# Patient Record
Sex: FEMALE | Race: WHITE | Employment: PART TIME | URBAN - METROPOLITAN AREA
[De-identification: names, ages, dates, MRNs, and addresses within clinical notes are randomized per-mention and may not be internally consistent; named-entity substitution may affect disease eponyms.]

---

## 2017-01-05 ENCOUNTER — TELEPHONE (OUTPATIENT)
Dept: INTERNAL MEDICINE CLINIC | Age: 52
End: 2017-01-05

## 2017-01-05 NOTE — TELEPHONE ENCOUNTER
Patient is requesting lab orders to be mailed to her house ahead of her appointment in February so she can get the labs done prior. Call her if you have any questions.

## 2017-01-18 ENCOUNTER — TELEPHONE (OUTPATIENT)
Dept: INTERNAL MEDICINE CLINIC | Age: 52
End: 2017-01-18

## 2017-01-18 NOTE — TELEPHONE ENCOUNTER
----- Message from Litzy Silva sent at 1/18/2017 11:56 AM EST -----  Regarding: Amy Morrow / Telephone  The patient is requesting a call back from the nurse to discuss lab work.  ([phone]

## 2017-01-18 NOTE — TELEPHONE ENCOUNTER
Returned patient's call. Patient stated when she initially made her physical appt back in December she was told by the  labs would be ordered prior to her appt & would be placed in the mail to complete. After reviewing her chart I noticed she called in early January requesting for labs prior to appt, and she notes then she was told by the  again she could have labs ordered prior to her visit. I discussed reasoning for not ordering labs prior to visits due to insurance issues with passing diagnosis codes. I also advised PCP does not prefer to order labs prior to visits. Patient states she pays out of pocket for her coverage and does not have the time or money to keep making follow up visits for abnormal labs. She notes a history of elevated liver enzymes & thinks its beneficial to get her labs done prior to discuss in detail at the visit. Patient stated she is going to send a Jigsaw Meetingt  Message to Cherelle Alonso to see if she will order labs prior.

## 2017-01-24 DIAGNOSIS — Z13.9 SCREENING: Primary | ICD-10-CM

## 2017-01-31 LAB
ALBUMIN SERPL-MCNC: 4.1 G/DL (ref 3.5–5.5)
ALBUMIN/GLOB SERPL: 1.5 {RATIO} (ref 1.1–2.5)
ALP SERPL-CCNC: 100 IU/L (ref 39–117)
ALT SERPL-CCNC: 54 IU/L (ref 0–32)
APPEARANCE UR: CLEAR
AST SERPL-CCNC: 39 IU/L (ref 0–40)
BASOPHILS # BLD AUTO: 0 X10E3/UL (ref 0–0.2)
BASOPHILS NFR BLD AUTO: 0 %
BILIRUB SERPL-MCNC: 0.4 MG/DL (ref 0–1.2)
BILIRUB UR QL STRIP: NEGATIVE
BUN SERPL-MCNC: 16 MG/DL (ref 6–24)
BUN/CREAT SERPL: 22 (ref 9–23)
CALCIUM SERPL-MCNC: 9.2 MG/DL (ref 8.7–10.2)
CHLORIDE SERPL-SCNC: 101 MMOL/L (ref 96–106)
CHOLEST SERPL-MCNC: 209 MG/DL (ref 100–199)
CO2 SERPL-SCNC: 26 MMOL/L (ref 18–29)
COLOR UR: YELLOW
CREAT SERPL-MCNC: 0.72 MG/DL (ref 0.57–1)
EOSINOPHIL # BLD AUTO: 0.1 X10E3/UL (ref 0–0.4)
EOSINOPHIL NFR BLD AUTO: 3 %
ERYTHROCYTE [DISTWIDTH] IN BLOOD BY AUTOMATED COUNT: 12.8 % (ref 12.3–15.4)
GLOBULIN SER CALC-MCNC: 2.7 G/DL (ref 1.5–4.5)
GLUCOSE SERPL-MCNC: 90 MG/DL (ref 65–99)
GLUCOSE UR QL: NEGATIVE
HCT VFR BLD AUTO: 40.7 % (ref 34–46.6)
HDLC SERPL-MCNC: 79 MG/DL
HGB BLD-MCNC: 13.5 G/DL (ref 11.1–15.9)
HGB UR QL STRIP: NEGATIVE
IMM GRANULOCYTES # BLD: 0 X10E3/UL (ref 0–0.1)
IMM GRANULOCYTES NFR BLD: 0 %
INTERPRETATION, 910389: NORMAL
KETONES UR QL STRIP: NEGATIVE
LDLC SERPL CALC-MCNC: 113 MG/DL (ref 0–99)
LEUKOCYTE ESTERASE UR QL STRIP: NEGATIVE
LYMPHOCYTES # BLD AUTO: 2.4 X10E3/UL (ref 0.7–3.1)
LYMPHOCYTES NFR BLD AUTO: 49 %
MCH RBC QN AUTO: 31.1 PG (ref 26.6–33)
MCHC RBC AUTO-ENTMCNC: 33.2 G/DL (ref 31.5–35.7)
MCV RBC AUTO: 94 FL (ref 79–97)
MICRO URNS: NORMAL
MONOCYTES # BLD AUTO: 0.3 X10E3/UL (ref 0.1–0.9)
MONOCYTES NFR BLD AUTO: 6 %
NEUTROPHILS # BLD AUTO: 2.1 X10E3/UL (ref 1.4–7)
NEUTROPHILS NFR BLD AUTO: 42 %
NITRITE UR QL STRIP: NEGATIVE
PH UR STRIP: 6.5 [PH] (ref 5–7.5)
PLATELET # BLD AUTO: 229 X10E3/UL (ref 150–379)
POTASSIUM SERPL-SCNC: 3.7 MMOL/L (ref 3.5–5.2)
PROT SERPL-MCNC: 6.8 G/DL (ref 6–8.5)
PROT UR QL STRIP: NEGATIVE
RBC # BLD AUTO: 4.34 X10E6/UL (ref 3.77–5.28)
SODIUM SERPL-SCNC: 142 MMOL/L (ref 134–144)
SP GR UR: 1.02 (ref 1–1.03)
T4 FREE SERPL-MCNC: 1.18 NG/DL (ref 0.82–1.77)
TRIGL SERPL-MCNC: 86 MG/DL (ref 0–149)
TSH SERPL DL<=0.005 MIU/L-ACNC: 1.92 UIU/ML (ref 0.45–4.5)
UROBILINOGEN UR STRIP-MCNC: 0.2 MG/DL (ref 0.2–1)
VLDLC SERPL CALC-MCNC: 17 MG/DL (ref 5–40)
WBC # BLD AUTO: 5 X10E3/UL (ref 3.4–10.8)

## 2017-02-13 ENCOUNTER — OFFICE VISIT (OUTPATIENT)
Dept: INTERNAL MEDICINE CLINIC | Age: 52
End: 2017-02-13

## 2017-02-13 VITALS
HEART RATE: 77 BPM | WEIGHT: 130.2 LBS | DIASTOLIC BLOOD PRESSURE: 83 MMHG | SYSTOLIC BLOOD PRESSURE: 131 MMHG | OXYGEN SATURATION: 99 % | HEIGHT: 63 IN | TEMPERATURE: 98.7 F | BODY MASS INDEX: 23.07 KG/M2 | RESPIRATION RATE: 16 BRPM

## 2017-02-13 DIAGNOSIS — R79.89 ABNORMAL LFTS: ICD-10-CM

## 2017-02-13 DIAGNOSIS — Z90.710 H/O: HYSTERECTOMY: ICD-10-CM

## 2017-02-13 DIAGNOSIS — Z00.00 WELL WOMAN EXAM (NO GYNECOLOGICAL EXAM): Primary | ICD-10-CM

## 2017-02-13 NOTE — MR AVS SNAPSHOT
Visit Information Date & Time Provider Department Dept. Phone Encounter #  
 2/13/2017  1:30 PM Yousuf Mcwilliams MD [facility] [address] [id number] Upcoming Health Maintenance Date Due INFLUENZA AGE 9 TO ADULT 8/1/2016 BREAST CANCER SCRN MAMMOGRAM 6/29/2018 PAP AKA CERVICAL CYTOLOGY 7/1/2019 DTaP/Tdap/Td series (2 - Td) 12/23/2023 COLONOSCOPY 2/21/2024 Allergies as of 2/13/2017  Review Complete On: 2/13/2017 By: Yousuf Mcwilliams MD  
 No Known Allergies Current Immunizations  Reviewed on 2/13/2017 Name Date Influenza Vaccine 10/15/2016 Tdap 12/23/2013 Reviewed by Yousuf Mcwilliams MD on 2/13/2017 at  1:53 PM  
You Were Diagnosed With   
  
 Codes Comments Well woman exam (no gynecological exam)    -  Primary ICD-10-CM: Z00.00 ICD-9-CM: V70.0 Abnormal LFTs     ICD-10-CM: R79.89 ICD-9-CM: 790.6 H/O: hysterectomy     ICD-10-CM: Z90.710 ICD-9-CM: V88.01 Vitals BP Pulse Temp Resp Height(growth percentile) Weight(growth percentile) 131/83 (BP 1 Location: Left arm, BP Patient Position: Sitting) 77 98.7 °F (37.1 °C) (Oral) 16 5' 2.5\" (1.588 m) 130 lb 3.2 oz (59.1 kg) LMP SpO2 BMI OB Status Smoking Status (Exact Date) 99% 23.43 kg/m2 Hysterectomy Never Smoker Vitals History BMI and BSA Data Body Mass Index Body Surface Area  
 23.43 kg/m 2 1.61 m 2 Preferred Pharmacy Pharmacy Name Phone [facility] [phone] Your Updated Medication List  
  
   
This list is accurate as of: 2/13/17  2:02 PM.  Always use your most recent med list.  
  
  
  
  
 Biotin 5 mg Tab Take  by mouth.  
  
 clobetasol 0.05 % topical cream  
Commonly known as:  Luetta Slimmer Apply  to affected area two (2) times a day. ESTRACE 0.5 mg tablet Generic drug:  estradiol Take  by mouth daily. STOOL SOFTENER PO Take  by mouth. Introducing [facility] & HEALTH SERVICES! Dear Noel Isaac: Thank you for requesting a [employer] account. Our records indicate that you already have an active [employer] account. You can access your account anytime at [email or web address]. DrDoctor/[employer] Did you know that you can access your hospital and ER discharge instructions at any time in Br[employer]? You can also review all of your test results from your hospital stay or ER visit. Additional Information If you have questions, please visit the Frequently Asked Questions section of the [employer] website at [email or web address] [email or web address]/. Remember, Br[employer] is NOT to be used for urgent needs. For medical emergencies, dial 911. Now available from your iPhone and Android! Please provide this summary of care documentation to your next provider. Your primary care clinician is listed as Shanna 68. If you have any questions after today's visit, please call [phone].

## 2017-02-13 NOTE — PROGRESS NOTES
Subjective:   50 y.o. female for Well Woman Check. Her gyne and breast care is done elsewhere by her Ob-Gyne physician. 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  clobetasol (TEMOVATE) 0.05 % topical cream Apply  to affected area two (2) times a day. 30 g 0    DOCUSATE CALCIUM (STOOL SOFTENER PO) Take  by mouth.  Biotin 5 mg tab Take  by mouth. No Known Allergies  History reviewed. No pertinent past medical history. Past Surgical History   Procedure Laterality Date    Hx gyn       hysterectomy for fibroids     Family History   Problem Relation Age of Onset    Hypertension Mother    [facility] Elevated Lipids Mother     Cancer Father      prostate ca     Social History   Substance Use Topics    Smoking status: Never Smoker    Smokeless tobacco: Not on file    Alcohol use 1.5 oz/week     3 Cans of beer per week      Comment: social             ROS: Feeling generally well. No TIA's or unusual headaches, no dysphagia. No prolonged cough. No dyspnea or chest pain on exertion. No abdominal pain, change in bowel habits, black or bloody stools. No urinary tract symptoms. No new or unusual musculoskeletal symptoms. Specific concerns today: colonoscopy normal in 2015  Pap in 3/13  mammo in 6/16  Had alt of 56 and notes occas ruq pain with sxs of indigestion -infreq episodes. Objective: The patient appears well, alert, oriented x 3, in no distress.   Visit Vitals    /83 (BP 1 Location: Left arm, BP Patient Position: Sitting)    Pulse 77    Temp 98.7 °F (37.1 °C) (Oral)    Resp 16    Ht 5' 2.5\" (1.588 m)    Wt 130 lb 3.2 oz (59.1 kg)    LMP  (Exact Date)    SpO2 99%    BMI 23.43 kg/m2     ENT normal.  Neck supple. No adenopathy or thyromegaly. SARAH. Lungs are clear, good air entry, no wheezes, rhonchi or rales. S1 and S2 normal, no murmurs, regular rate and rhythm. Abdomen soft with mild  Tenderness ruq guarding, mass or organomegaly. Extremities show no edema, normal peripheral pulses. Neurological is normal, no focal findings.   Breast and Pelvic exams deferred    Assessment/Plan:   Well Woman  increase physical activity, follow low fat diet  Lilia was seen today for complete physical.    Diagnoses and all orders for this visit:    Well woman exam (no gynecological exam)  Recent labs normal except for the alt mildly inc and this is chronic  Abnormal LFTs  -     US ABD COMP; Future    H/O: hysterectomy  cotn gyn care

## 2017-03-10 ENCOUNTER — HOSPITAL ENCOUNTER (OUTPATIENT)
Dept: ULTRASOUND IMAGING | Age: 52
Discharge: HOME | End: 2017-03-10
Attending: INTERNAL MEDICINE
Payer: COMMERCIAL

## 2017-03-10 DIAGNOSIS — R79.89 ABNORMAL LFTS: ICD-10-CM

## 2017-03-10 PROCEDURE — 76700 US EXAM ABDOM COMPLETE: CPT

## 2017-03-10 NOTE — LETTER
4/17/2017 2:09 PM 
 
Ms. Park [address] [address] Dear Carlni Smith: 
 
Please find your most recent results below. Resulted Orders US ABD COMP Narrative EXAM:  US ABD COMP INDICATION: Other specified abnormal findings of blood chemistry, abnormal liver 
function tests. COMPARISON: None. TECHNIQUE:  
Real-time sonography of the abdomen was performed with multiple static images of 
the liver, gallbladder, pancreas, spleen, kidneys and retroperitoneum obtained. FINDINGS: 
LIVER:  
The liver is normal in echotexture with no mass or other focal abnormality. LIVER VASCULATURE: The portal vein flow is towards the liver. GALLBLADDER: 
The gallbladder is normal and no stones are identified. There is no wall 
thickening or fluid around the gallbladder. COMMON BILE DUCT: 
There is no biliary duct dilatation and the common duct measures 5 mm in 
diameter. PANCREAS: 
The visualized pancreas is normal. 
 
SPLEEN: 
The spleen is normal in echotexture and size and measures 8 cm in length. RIGHT KIDNEY: 
The right kidney demonstrates normal echogenicity with no mass, stone or 
hydronephrosis. The right kidney measures 8.9 cm in length. LEFT KIDNEY: 
The left kidney demonstrates normal echogenicity with no mass, stone or 
hydronephrosis. The left kidney measures 8.7 cm in length. RETROPERITONEUM: 
The aorta tapers normally. The IVC is normal. 
No retroperitoneal mass is identified. Impression IMPRESSION: Normal abdominal ultrasound examination. RECOMMENDATIONS: 
None. Keep up the good work! Please call me if you have any questions: [phone] Sincerely, [facility] US 2

## 2017-03-13 ENCOUNTER — TELEPHONE (OUTPATIENT)
Dept: INTERNAL MEDICINE CLINIC | Age: 52
End: 2017-03-13

## 2017-03-13 NOTE — TELEPHONE ENCOUNTER
----- Message from Radhika Lin LPN sent at 5/80/8264 10:52 AM EDT -----      ----- Message -----     From: Marta Orozco MD     Sent: 3/11/2017   7:36 PM       To:  Radhika Lin LPN    Notify of normal abd us

## 2018-01-23 ENCOUNTER — OFFICE VISIT (OUTPATIENT)
Dept: INTERNAL MEDICINE CLINIC | Age: 53
End: 2018-01-23

## 2018-01-23 ENCOUNTER — TELEPHONE (OUTPATIENT)
Dept: INTERNAL MEDICINE CLINIC | Age: 53
End: 2018-01-23

## 2018-01-23 VITALS
TEMPERATURE: 98.5 F | OXYGEN SATURATION: 98 % | HEIGHT: 63 IN | HEART RATE: 87 BPM | RESPIRATION RATE: 16 BRPM | DIASTOLIC BLOOD PRESSURE: 79 MMHG | BODY MASS INDEX: 24.27 KG/M2 | SYSTOLIC BLOOD PRESSURE: 116 MMHG | WEIGHT: 137 LBS

## 2018-01-23 DIAGNOSIS — Z80.0 FH: COLON CANCER IN RELATIVE DIAGNOSED AT >50 YEARS OLD: ICD-10-CM

## 2018-01-23 DIAGNOSIS — M79.641 PAIN OF RIGHT HAND: Primary | ICD-10-CM

## 2018-01-23 NOTE — MR AVS SNAPSHOT
[address] 
[phone] Patient: Alanna Lau MRN: [id number] :1965 Visit Information Date & Time Provider Department Dept. Phone Encounter #  
 2018  8:15 AM Eladia Benedict MD [facility] [address] [id number] Upcoming Health Maintenance Date Due  
 BREAST CANCER SCRN MAMMOGRAM 2018 PAP AKA CERVICAL CYTOLOGY 2019 DTaP/Tdap/Td series (2 - Td) 2023 COLONOSCOPY 2024 Allergies as of 2018  Review Complete On: 2017 By: Eladia Benedict MD  
 No Known Allergies Current Immunizations  Reviewed on 2018 Name Date Hep B Vaccine 2017 Influenza Vaccine 10/15/2017, 10/15/2016 Tdap 2013 Reviewed by Eladia Benedict MD on 2018 at  8:09 AM  
 Reviewed by Eladia Benedict MD on 2018 at  8:18 AM  
You Were Diagnosed With   
  
 Codes Comments Pain of right hand    -  Primary ICD-10-CM: M79.641 ICD-9-CM: 729.5 FH: colon cancer in relative diagnosed at >47 years old     ICD-10-CM: Z80.0 ICD-9-CM: V16.0 Vitals BP Pulse Temp Resp Height(growth percentile) Weight(growth percentile) 116/79 (BP 1 Location: Left arm, BP Patient Position: Sitting) 87 98.5 °F (36.9 °C) (Oral) 16 5' 2.5\" (1.588 m) 137 lb (62.1 kg) SpO2 BMI OB Status Smoking Status 98% 24.66 kg/m2 Hysterectomy Never Smoker Vitals History BMI and BSA Data Body Mass Index Body Surface Area  
 24.66 kg/m 2 1.65 m 2 Preferred Pharmacy Pharmacy Name Phone [address] [facility], [address] [facility] [address] Your Updated Medication List  
  
   
This list is accurate as of: 18  8:19 AM.  Always use your most recent med list.  
  
  
  
  
 Biotin 5 mg tablet Commonly known as:  VITAMIN B7 Take  by mouth. ESTRACE 0.5 mg tablet Generic drug:  estradiol Take  by mouth daily. STOOL SOFTENER PO Take  by mouth. We Performed the Following REFERRAL TO ORTHOPEDICS [MDC817 Custom] Referral Information Referral ID Referred By Referred To  
  
 [id number] [facility] ELIZABETH VERA [facility], Lucy Balbuena MD   
   [address] [facility] Phone: [phone] Fax: [phone] Visits Status Start Date End Date 1 New Request 1/23/18 1/23/19 If your referral has a status of pending review or denied, additional information will be sent to support the outcome of this decision. Introducing [facility] & HEALTH SERVICES! Dear Nikolai: Thank you for requesting a [employer] account. Our records indicate that you already have an active [employer] account. You can access your account anytime at [email or web address] Seen on TV. RegaloCard/As Seen on TV Did you know that you can access your hospital and ER discharge instructions at any time in [employer]? You can also review all of your test results from your hospital stay or ER visit. Additional Information If you have questions, please visit the Frequently Asked Questions section of the [employer] website at [email or web address] [email or web address] on [email or web address]/As Seen on TV/. Remember, [employer] is NOT to be used for urgent needs. For medical emergencies, dial 911. Now available from your iPhone and Android! Please provide this summary of care documentation to your next provider. Your primary care clinician is listed as Shanna Aguilar. If you have any questions after today's visit, please call [phone].

## 2018-01-23 NOTE — TELEPHONE ENCOUNTER
Referral obtained and faxed to Dr Joanna Miranda office at [phone].  AUth # [id number]  00 visits  1/23/18-7/23/18

## 2018-01-23 NOTE — PROGRESS NOTES
HISTORY OF PRESENT ILLNESS  Aram Deleon is a 50 y.o. female. HPI  In November she was playing with her , right fourth finger was jammed against his leg. It got immediately swollen and tender. She did not seek care until now because swelling around the PIP joint has remained. She is not having pain. Mom has been diagnosed with colon cancer at [de-identified]. Tona Brown did have a colonoscopy around 50, normal.      Review of Systems   Musculoskeletal: Negative for joint pain. Neurological: Negative for focal weakness. Physical Exam   Constitutional: She is oriented to person, place, and time. She appears well-developed and well-nourished. Musculoskeletal:   Right 4th digit with swelling at pip joint and dec rom   Neurological: She is alert and oriented to person, place, and time. Psychiatric: She has a normal mood and affect. Her behavior is normal. Judgment and thought content normal.   Nursing note and vitals reviewed. ASSESSMENT and PLAN  Diagnoses and all orders for this visit:    1. Pain of right hand  -     [place] [facility]    2.  FH: colon cancer in relative diagnosed at >47 years old    q 5 year colonoscopy discussed

## 2018-01-23 NOTE — TELEPHONE ENCOUNTER
Dr Brittany Sandoval (Orthopedocs)    [address] [facility]    Phone [phone]    Fax [phone]    M 76.911 (ICD-10-cm) Pain of right Hand    01/24/2018  3:40 PM           Mobile     [phone]

## 2018-08-13 ENCOUNTER — OFFICE VISIT (OUTPATIENT)
Dept: URGENT CARE | Age: 53
End: 2018-08-13

## 2018-08-13 VITALS
BODY MASS INDEX: 22.68 KG/M2 | TEMPERATURE: 97.9 F | HEIGHT: 63 IN | OXYGEN SATURATION: 98 % | HEART RATE: 94 BPM | RESPIRATION RATE: 18 BRPM | SYSTOLIC BLOOD PRESSURE: 149 MMHG | DIASTOLIC BLOOD PRESSURE: 93 MMHG | WEIGHT: 128 LBS

## 2018-08-13 DIAGNOSIS — L30.9 PERIORBITAL DERMATITIS: Primary | ICD-10-CM

## 2018-08-13 RX ORDER — PREDNISONE 20 MG/1
20 TABLET ORAL 2 TIMES DAILY
Qty: 10 TAB | Refills: 0 | Status: SHIPPED | OUTPATIENT
Start: 2018-08-13 | End: 2018-08-18

## 2018-08-13 NOTE — PROGRESS NOTES
Patient is a 48 y.o. female presenting with skin problem. Skin Problem   This is a new problem. The current episode started more than 2 days ago. The problem occurs constantly. The problem has not changed since onset. Associated symptoms comments: Itching/irritation and dryness around bilateral eyes  No eye sxs. She has tried nothing for the symptoms. Past Medical History:   Diagnosis Date    FH: colon cancer in relative diagnosed at >47 years old 1/23/2018    Mom age [de-identified]         Past Surgical History:   Procedure Laterality Date    HX GYN      hysterectomy for fibroids         Family History   Problem Relation Age of Onset    Hypertension Mother    Josseline Vaughan Elevated Lipids Mother     Cancer Father      prostate ca        Social History     Social History    Marital status:      Spouse name: N/A    Number of children: N/A    Years of education: N/A     Occupational History    Not on file. Social History Main Topics    Smoking status: Never Smoker    Smokeless tobacco: Never Used    Alcohol use 1.5 oz/week     3 Cans of beer per week      Comment: social    Drug use: No    Sexual activity: Yes     Partners: Male     Other Topics Concern    Not on file     Social History Narrative                ALLERGIES: Review of patient's allergies indicates no known allergies. Review of Systems   Eyes: Negative for pain, discharge, redness and itching. All other systems reviewed and are negative. Vitals:    08/13/18 1139   BP: (!) 149/93   Pulse: 94   Resp: 18   Temp: 97.9 °F (36.6 °C)   SpO2: 98%   Weight: 128 lb (58.1 kg)   Height: 5' 3\" (1.6 m)       Physical Exam   Skin:   Bilateral- periorbital dry skin with mild cracking on angle and irritation    Nursing note and vitals reviewed. MDM    Procedures      ICD-10-CM ICD-9-CM    1.  Periorbital dermatitis L30.9 692.9      May use Vaseline/ petroleum jelly     Medications Ordered Today   Medications    predniSONE (DELTASONE) 20 mg tablet Sig: Take 1 Tab by mouth two (2) times a day for 5 days. With food     Dispense:  10 Tab     Refill:  0     No results found for any visits on 08/13/18. The patients condition was discussed with the patient and they understand. The patient is to follow up with primary care doctor. If signs and symptoms become worse the pt is to go to the ER. The patient is to take medications as prescribed.

## 2018-08-13 NOTE — MR AVS SNAPSHOT
[address] [address] 
[phone] Patient: Shruthi Schwarz MRN: [id number] :[id number] Visit Information Date & Time Provider Department Dept. Phone Encounter #  
 2018 12:00 PM [address] [phone] [id number] Your Appointments 10/30/2018  8:00 AM  
Complete Physical with Jackson Harris MD  
[facility]: Complete Physical Exam  
 [address] [place] [facility] [id number]  
[phone]  
  
   
 [address] [facility] [address] Upcoming Health Maintenance Date Due  
 BREAST CANCER SCRN MAMMOGRAM 2018 Influenza Age 5 to Adult 2018 PAP AKA CERVICAL CYTOLOGY 2019 DTaP/Tdap/Td series (2 - Td) 2023 COLONOSCOPY 2024 Allergies as of 2018  Review Complete On: 2018 By: Esthela Mckeon RN No Known Allergies Current Immunizations  Reviewed on 2018 Name Date Hep B Vaccine 2017 Influenza Vaccine 10/15/2017, 10/15/2016 Tdap 2013 Not reviewed this visit You Were Diagnosed With   
  
 Codes Comments Periorbital dermatitis    -  Primary ICD-10-CM: L30.9 ICD-9-CM: 692.9 Vitals BP Pulse Temp Resp Height(growth percentile) Weight(growth percentile) (!) 149/93 94 97.9 °F (36.6 °C) 18 5' 3\" (1.6 m) 128 lb (58.1 kg) SpO2 BMI OB Status Smoking Status 98% 22.67 kg/m2 Hysterectomy Never Smoker BMI and BSA Data Body Mass Index Body Surface Area  
 22.67 kg/m 2 1.61 m 2 Preferred Pharmacy Pharmacy Name Phone [facility] [address] [place] [address] Avenue [phone] Your Updated Medication List  
  
   
This list is accurate as of 18 12:07 PM.  Always use your most recent med list.  
  
  
  
  
 ESTRACE 0.5 mg tablet Generic drug:  estradiol Take  by mouth daily. predniSONE 20 mg tablet Commonly known as:  Kimmyguero Adames Take 1 Tab by mouth two (2) times a day for 5 days. With food Prescriptions Sent to Pharmacy Refills  
 predniSONE (DELTASONE) 20 mg tablet 0 Sig: Take 1 Tab by mouth two (2) times a day for 5 days. With food Class: Normal  
 Pharmacy: [address], [address] [place] [address] Ph #: [phone] Route: Oral  
  
Patient Instructions Dermatitis: Care Instructions Your Care Instructions Dermatitis is the general name used for any rash or inflammation of the skin. Different kinds of dermatitis cause different kinds of rashes. Common causes of a rash include new medicines, plants (such as poison oak or poison ivy), heat, and stress. Certain illnesses can also cause a rash. An allergic reaction to something that touches your skin, such as latex, nickel, or poison ivy, is called contact dermatitis. Contact dermatitis may also be caused by something that irritates the skin, such as bleach, a chemical, or soap. These types of rashes cannot be spread from person to person. How long your rash will last depends on what caused it. Rashes may last a few days or months. Follow-up care is a key part of your treatment and safety. Be sure to make and go to all appointments, and call your doctor if you are having problems. It's also a good idea to know your test results and keep a list of the medicines you take. How can you care for yourself at home? · Do not scratch the rash. Cut your nails short, and file them smooth. Or wear gloves if this helps keep you from scratching. · Wash the area with water only. Pat dry. · Put cold, wet cloths on the rash to reduce itching. · Keep cool, and stay out of the sun. · Leave the rash open to the air as much as possible.  
· If the rash itches, use hydrocortisone cream. Follow the directions on the label. Calamine lotion may help for plant rashes. · Take an over-the-counter antihistamine, such as diphenhydramine (Benadryl) or loratadine (Claritin), to help calm the itching. Read and follow all instructions on the label. · If your doctor prescribed a cream, use it as directed. If your doctor prescribed medicine, take it exactly as directed. When should you call for help? Call your doctor now or seek immediate medical care if: 
  · You have symptoms of infection, such as: 
¨ Increased pain, swelling, warmth, or redness. ¨ Red streaks leading from the area. ¨ Pus draining from the area. ¨ A fever.  
  · You have joint pain along with the rash.  
 Watch closely for changes in your health, and be sure to contact your doctor if: 
  · Your rash is changing or getting worse.  
  · You are not getting better as expected. Where can you learn more? Go to http://luh-yashira.info/. Enter (25) 7571 4377 in the search box to learn more about \"Dermatitis: Care Instructions. \" Current as of: October 5, 2017 Content Version: 11.7 © 1124-5423 [employer]. Care instructions adapted under license by [employer] (which disclaims liability or warranty for this information). If you have questions about a medical condition or this instruction, always ask your healthcare professional. [address] any warranty or liability for your use of this information. Introducing \[facility]\"" & HEALTH SERVICES! Dear Monica Groves: Thank you for requesting a [employer] account. Our records indicate that you already have an active [employer] account. You can access your account anytime at [email or web address] Did you know that you can access your hospital and ER discharge instructions at any time in [employer]? You can also review all of your test results from your hospital stay or ER visit. Additional Information If you have questions, please visit the Frequently Asked Questions section of the [employer] website at [email or web address] Remember, [employer]m is NOT to be used for urgent needs. For medical emergencies, dial 911. Now available from your iPhone and Android! Please provide this summary of care documentation to your next provider. Your primary care clinician is listed as Shanna Aguilar. If you have any questions after today's visit, please call [phone].

## 2018-08-13 NOTE — PATIENT INSTRUCTIONS
Dermatitis: Care Instructions  Your Care Instructions  Dermatitis is the general name used for any rash or inflammation of the skin. Different kinds of dermatitis cause different kinds of rashes. Common causes of a rash include new medicines, plants (such as poison oak or poison ivy), heat, and stress. Certain illnesses can also cause a rash. An allergic reaction to something that touches your skin, such as latex, nickel, or poison ivy, is called contact dermatitis. Contact dermatitis may also be caused by something that irritates the skin, such as bleach, a chemical, or soap. These types of rashes cannot be spread from person to person. How long your rash will last depends on what caused it. Rashes may last a few days or months. Follow-up care is a key part of your treatment and safety. Be sure to make and go to all appointments, and call your doctor if you are having problems. It's also a good idea to know your test results and keep a list of the medicines you take. How can you care for yourself at home? · Do not scratch the rash. Cut your nails short, and file them smooth. Or wear gloves if this helps keep you from scratching. · Wash the area with water only. Pat dry. · Put cold, wet cloths on the rash to reduce itching. · Keep cool, and stay out of the sun. · Leave the rash open to the air as much as possible. · If the rash itches, use hydrocortisone cream. Follow the directions on the label. Calamine lotion may help for plant rashes. · Take an over-the-counter antihistamine, such as diphenhydramine (Benadryl) or loratadine (Claritin), to help calm the itching. Read and follow all instructions on the label. · If your doctor prescribed a cream, use it as directed. If your doctor prescribed medicine, take it exactly as directed. When should you call for help?   Call your doctor now or seek immediate medical care if:    · You have symptoms of infection, such as:  ¨ Increased pain, swelling, warmth, or redness. ¨ Red streaks leading from the area. ¨ Pus draining from the area. ¨ A fever.     · You have joint pain along with the rash.    Watch closely for changes in your health, and be sure to contact your doctor if:    · Your rash is changing or getting worse.     · You are not getting better as expected. Where can you learn more? Go to http://luh-yashira.info/. Enter [phone] in the search box to learn more about \"Dermatitis: Care Instructions. \"  Current as of: October 5, 2017  Content Version: 11.7  © 6498-1590 [employer]. Care instructions adapted under license by [employer] (which disclaims liability or warranty for this information). If you have questions about a medical condition or this instruction, always ask your healthcare professional. [address] any warranty or liability for your use of this information.

## 2018-10-19 ENCOUNTER — TELEPHONE (OUTPATIENT)
Dept: INTERNAL MEDICINE CLINIC | Age: 53
End: 2018-10-19

## 2018-10-19 NOTE — TELEPHONE ENCOUNTER
Patient reports since Aug she has been dealing with a rash around her eyes. It is not affecting her vision at this time. She has been seeing urgent care & has been on 3 rounds of steroids which help but the rash will come back a week later after completing the steroids. Advised patient PCP is booked & no availability today with any other provider. Advised can be worked in on Monday at 11:00. Patient agreed to appt.

## 2018-10-22 ENCOUNTER — OFFICE VISIT (OUTPATIENT)
Dept: INTERNAL MEDICINE CLINIC | Age: 53
End: 2018-10-22

## 2018-10-22 VITALS
HEIGHT: 63 IN | SYSTOLIC BLOOD PRESSURE: 129 MMHG | RESPIRATION RATE: 16 BRPM | WEIGHT: 125 LBS | DIASTOLIC BLOOD PRESSURE: 85 MMHG | OXYGEN SATURATION: 98 % | TEMPERATURE: 97.8 F | HEART RATE: 91 BPM | BODY MASS INDEX: 22.15 KG/M2

## 2018-10-22 DIAGNOSIS — Z00.00 GENERAL MEDICAL EXAM: ICD-10-CM

## 2018-10-22 DIAGNOSIS — L30.8 OTHER ECZEMA: Primary | ICD-10-CM

## 2018-10-22 RX ORDER — DESONIDE 0.5 MG/G
OINTMENT TOPICAL 2 TIMES DAILY
Qty: 15 G | Refills: 0 | Status: SHIPPED | OUTPATIENT
Start: 2018-10-22 | End: 2022-04-05 | Stop reason: SDUPTHER

## 2018-10-22 NOTE — PROGRESS NOTES
HISTORY OF PRESENT ILLNESS Sheri Murrieta is a 48 y.o. female. HPI She has intermittently for the last several months had episodes of swelling, flaking, redness around her eyes bilaterally. Does not affect her hands or trunk. Somewhat itchy. She has been to urgent care and treated with steroids at least three times and does respond to the oral prednisone. She's not having fevers or chills. She's not changed any skin care products. She does not have any autoimmune history. She would like to do all her labs as she's coming in next week for her physical. 
 
 
Review of Systems Constitutional: Negative for chills, fever and malaise/fatigue. Respiratory: Negative for cough, shortness of breath and wheezing. Musculoskeletal: Negative for joint pain. Skin: Positive for itching and rash. Physical Exam  
Constitutional: She appears well-developed and well-nourished. HENT:  
Head: Normocephalic. Right Ear: Tympanic membrane, external ear and ear canal normal.  
Left Ear: Tympanic membrane, external ear and ear canal normal.  
Nose: Nose normal.  
Mouth/Throat: Oropharynx is clear and moist and mucous membranes are normal. No oropharyngeal exudate. Eyes: Conjunctivae are normal. Pupils are equal, round, and reactive to light. Right eye exhibits no discharge. Left eye exhibits no discharge. Neck: Normal range of motion. Neck supple. Cardiovascular: Normal rate, regular rhythm and normal heart sounds. Pulmonary/Chest: Effort normal and breath sounds normal. No respiratory distress. She has no wheezes. She has no rales. Lymphadenopathy:  
  She has no cervical adenopathy. Skin: Skin is dry. No rash noted. No erythema. Nursing note and vitals reviewed. ASSESSMENT and PLAN Diagnoses and all orders for this visit: 
 
Other eczema 
-     desonide (DESOWEN) 0.05 % topical ointment; Apply  to affected area two (2) times a day. -     SED RATE (ESR) 
-     CRP, HIGH SENSITIVITY General medical exam 
-     METABOLIC PANEL, COMPREHENSIVE 
-     LIPID PANEL 
-     TSH 3RD GENERATION 
-     CBC WITH AUTOMATED DIFF 
-     T4, FREE

## 2018-10-23 LAB
ALBUMIN SERPL-MCNC: 4.5 G/DL (ref 3.5–5.5)
ALBUMIN/GLOB SERPL: 1.6 {RATIO} (ref 1.2–2.2)
ALP SERPL-CCNC: 80 IU/L (ref 39–117)
ALT SERPL-CCNC: 26 IU/L (ref 0–32)
AST SERPL-CCNC: 28 IU/L (ref 0–40)
BASOPHILS # BLD AUTO: 0 X10E3/UL (ref 0–0.2)
BASOPHILS NFR BLD AUTO: 0 %
BILIRUB SERPL-MCNC: 0.7 MG/DL (ref 0–1.2)
BUN SERPL-MCNC: 15 MG/DL (ref 6–24)
BUN/CREAT SERPL: 20 (ref 9–23)
CALCIUM SERPL-MCNC: 9.6 MG/DL (ref 8.7–10.2)
CHLORIDE SERPL-SCNC: 100 MMOL/L (ref 96–106)
CHOLEST SERPL-MCNC: 249 MG/DL (ref 100–199)
CO2 SERPL-SCNC: 21 MMOL/L (ref 20–29)
CREAT SERPL-MCNC: 0.74 MG/DL (ref 0.57–1)
CRP SERPL HS-MCNC: 2.48 MG/L (ref 0–3)
EOSINOPHIL # BLD AUTO: 0.1 X10E3/UL (ref 0–0.4)
EOSINOPHIL NFR BLD AUTO: 1 %
ERYTHROCYTE [DISTWIDTH] IN BLOOD BY AUTOMATED COUNT: 12.9 % (ref 12.3–15.4)
ERYTHROCYTE [SEDIMENTATION RATE] IN BLOOD BY WESTERGREN METHOD: 6 MM/HR (ref 0–40)
GLOBULIN SER CALC-MCNC: 2.8 G/DL (ref 1.5–4.5)
GLUCOSE SERPL-MCNC: 89 MG/DL (ref 65–99)
HCT VFR BLD AUTO: 42.8 % (ref 34–46.6)
HDLC SERPL-MCNC: 103 MG/DL
HGB BLD-MCNC: 14.3 G/DL (ref 11.1–15.9)
IMM GRANULOCYTES # BLD: 0 X10E3/UL (ref 0–0.1)
IMM GRANULOCYTES NFR BLD: 0 %
INTERPRETATION, 910389: NORMAL
LDLC SERPL CALC-MCNC: 129 MG/DL (ref 0–99)
LYMPHOCYTES # BLD AUTO: 1.6 X10E3/UL (ref 0.7–3.1)
LYMPHOCYTES NFR BLD AUTO: 35 %
MCH RBC QN AUTO: 31.3 PG (ref 26.6–33)
MCHC RBC AUTO-ENTMCNC: 33.4 G/DL (ref 31.5–35.7)
MCV RBC AUTO: 94 FL (ref 79–97)
MONOCYTES # BLD AUTO: 0.3 X10E3/UL (ref 0.1–0.9)
MONOCYTES NFR BLD AUTO: 7 %
NEUTROPHILS # BLD AUTO: 2.6 X10E3/UL (ref 1.4–7)
NEUTROPHILS NFR BLD AUTO: 57 %
PLATELET # BLD AUTO: 256 X10E3/UL (ref 150–379)
POTASSIUM SERPL-SCNC: 4 MMOL/L (ref 3.5–5.2)
PROT SERPL-MCNC: 7.3 G/DL (ref 6–8.5)
RBC # BLD AUTO: 4.57 X10E6/UL (ref 3.77–5.28)
SODIUM SERPL-SCNC: 142 MMOL/L (ref 134–144)
T4 FREE SERPL-MCNC: 1.29 NG/DL (ref 0.82–1.77)
TRIGL SERPL-MCNC: 85 MG/DL (ref 0–149)
TSH SERPL DL<=0.005 MIU/L-ACNC: 1.48 UIU/ML (ref 0.45–4.5)
VLDLC SERPL CALC-MCNC: 17 MG/DL (ref 5–40)
WBC # BLD AUTO: 4.6 X10E3/UL (ref 3.4–10.8)

## 2018-10-23 NOTE — PROGRESS NOTES
Notify her markers for inflammation were normal no sign of systemic inflammation and cbc normal cholesterol is up from last check so work on diet and exercise and repeat in 6 mo

## 2018-10-30 ENCOUNTER — OFFICE VISIT (OUTPATIENT)
Dept: INTERNAL MEDICINE CLINIC | Age: 53
End: 2018-10-30

## 2018-10-30 ENCOUNTER — HOSPITAL ENCOUNTER (OUTPATIENT)
Dept: MAMMOGRAPHY | Age: 53
Discharge: HOME | End: 2018-10-30
Payer: COMMERCIAL

## 2018-10-30 VITALS
WEIGHT: 125 LBS | DIASTOLIC BLOOD PRESSURE: 77 MMHG | SYSTOLIC BLOOD PRESSURE: 121 MMHG | OXYGEN SATURATION: 98 % | RESPIRATION RATE: 16 BRPM | TEMPERATURE: 97.7 F | BODY MASS INDEX: 22.15 KG/M2 | HEART RATE: 80 BPM | HEIGHT: 63 IN

## 2018-10-30 DIAGNOSIS — Z00.00 WELL WOMAN EXAM (NO GYNECOLOGICAL EXAM): ICD-10-CM

## 2018-10-30 DIAGNOSIS — Z00.00 WELL WOMAN EXAM (NO GYNECOLOGICAL EXAM): Primary | ICD-10-CM

## 2018-10-30 PROCEDURE — 77067 SCR MAMMO BI INCL CAD: CPT

## 2018-10-30 NOTE — PROGRESS NOTES
Subjective:   48 y.o. female for Well Woman Check. Her gyne and breast care is done elsewhere by her Ob-Gyne physician. Sp hyst  Due for colonoscopy in 2019 and reviewed due to fh colon cancer  mammo due and ordered   l[place] in . [place] 12 pounds intentionally with inc protein and dec carbs and lft back to normal and crp low  Skin is better    Patient Active Problem List    Diagnosis Date Noted    FH: colon cancer in relative diagnosed at >47 years old 01/23/2018    H/O: hysterectomy 04/14/2015     Current Outpatient Medications   Medication Sig Dispense Refill    desonide (DESOWEN) 0.05 % topical ointment Apply  to affected area two (2) times a day. 15 g 0    estradiol (ESTRACE) 0.5 mg tablet Take  by mouth daily. No Known Allergies  Past Medical History:   Diagnosis Date    FH: colon cancer in relative diagnosed at >47 years old 1/23/2018    Mom age [de-identified]      Past Surgical History:   Procedure Laterality Date    HX GYN      hysterectomy for fibroids     Family History   Problem Relation Age of Onset    Hypertension Mother    [place] Elevated Lipids Mother     Cancer Father         prostate ca     Social History     Tobacco Use    Smoking status: Never Smoker    Smokeless tobacco: Never Used   Substance Use Topics    Alcohol use: Yes     Alcohol/week: 1.5 oz     Types: 3 Cans of beer per week     Comment: social             ROS: Feeling generally well. No TIA's or unusual headaches, no dysphagia. No prolonged cough. No dyspnea or chest pain on exertion. No abdominal pain, change in bowel habits, black or bloody stools. No urinary tract symptoms. No new or unusual musculoskeletal symptoms. Specific concerns today: none. Objective: The patient appears well, alert, oriented x 3, in no distress.   Visit Vitals  /77 (BP 1 Location: Left arm, BP Patient Position: Sitting)   Pulse 80   Temp 97.7 °F (36.5 °C) (Oral)   Resp 16   Ht 5' 3\" (1.6 m)   Wt 125 lb (56.7 kg)   SpO2 98%   BMI 22.14 kg/m²     ENT normal.  Neck supple. No adenopathy or thyromegaly. SARAH. Lungs are clear, good air entry, no wheezes, rhonchi or rales. S1 and S2 normal, no murmurs, regular rate and rhythm. Abdomen soft without tenderness, guarding, mass or organomegaly. Extremities show no edema, normal peripheral pulses. Neurological is normal, no focal findings. Breast no masses axillary nodes or discharge    Assessment/Plan:   Well Woman  continue present plan  Diagnoses and all orders for this visit:    1. Well woman exam (no gynecological exam)  -     [facility] 3D VERN W MAMMO BI SCREENING INCL CAD;  Future    Labs reviewed and overall improved cont diet as she is doing  Discussed colonoscopy and prevnar at age 63  Discussed exercise

## 2022-03-19 PROBLEM — Z80.0 FH: COLON CANCER IN RELATIVE DIAGNOSED AT >50 YEARS OLD: Status: ACTIVE | Noted: 2018-01-23

## 2022-04-05 ENCOUNTER — OFFICE VISIT (OUTPATIENT)
Dept: INTERNAL MEDICINE CLINIC | Age: 57
End: 2022-04-05
Payer: COMMERCIAL

## 2022-04-05 VITALS
DIASTOLIC BLOOD PRESSURE: 84 MMHG | HEIGHT: 63 IN | RESPIRATION RATE: 16 BRPM | HEART RATE: 96 BPM | BODY MASS INDEX: 23.85 KG/M2 | WEIGHT: 134.6 LBS | OXYGEN SATURATION: 97 % | TEMPERATURE: 97.8 F | SYSTOLIC BLOOD PRESSURE: 132 MMHG

## 2022-04-05 DIAGNOSIS — M72.2 PLANTAR FASCIITIS OF LEFT FOOT: ICD-10-CM

## 2022-04-05 DIAGNOSIS — Z90.710 H/O: HYSTERECTOMY: ICD-10-CM

## 2022-04-05 DIAGNOSIS — Z80.0 FH: COLON CANCER IN RELATIVE DIAGNOSED AT >50 YEARS OLD: ICD-10-CM

## 2022-04-05 DIAGNOSIS — Z00.00 WELL WOMAN EXAM (NO GYNECOLOGICAL EXAM): Primary | ICD-10-CM

## 2022-04-05 DIAGNOSIS — L30.8 OTHER ECZEMA: ICD-10-CM

## 2022-04-05 LAB
ALBUMIN SERPL-MCNC: 3.8 G/DL (ref 3.5–5)
ALBUMIN/GLOB SERPL: 1.1 {RATIO} (ref 1.1–2.2)
ALP SERPL-CCNC: 86 U/L (ref 45–117)
ALT SERPL-CCNC: 49 U/L (ref 12–78)
ANION GAP SERPL CALC-SCNC: 7 MMOL/L (ref 5–15)
AST SERPL-CCNC: 35 U/L (ref 15–37)
BASOPHILS # BLD: 0 K/UL (ref 0–0.1)
BASOPHILS NFR BLD: 0 % (ref 0–1)
BILIRUB SERPL-MCNC: 0.5 MG/DL (ref 0.2–1)
BUN SERPL-MCNC: 17 MG/DL (ref 6–20)
BUN/CREAT SERPL: 25 (ref 12–20)
CALCIUM SERPL-MCNC: 8.7 MG/DL (ref 8.5–10.1)
CHLORIDE SERPL-SCNC: 103 MMOL/L (ref 97–108)
CHOLEST SERPL-MCNC: 238 MG/DL
CO2 SERPL-SCNC: 26 MMOL/L (ref 21–32)
CREAT SERPL-MCNC: 0.69 MG/DL (ref 0.55–1.02)
DIFFERENTIAL METHOD BLD: NORMAL
EOSINOPHIL # BLD: 0.1 K/UL (ref 0–0.4)
EOSINOPHIL NFR BLD: 2 % (ref 0–7)
ERYTHROCYTE [DISTWIDTH] IN BLOOD BY AUTOMATED COUNT: 12.5 % (ref 11.5–14.5)
EST. AVERAGE GLUCOSE BLD GHB EST-MCNC: 103 MG/DL
GLOBULIN SER CALC-MCNC: 3.4 G/DL (ref 2–4)
GLUCOSE SERPL-MCNC: 82 MG/DL (ref 65–100)
HBA1C MFR BLD: 5.2 % (ref 4–5.6)
HCT VFR BLD AUTO: 40.7 % (ref 35–47)
HDLC SERPL-MCNC: 73 MG/DL
HDLC SERPL: 3.3 {RATIO} (ref 0–5)
HGB BLD-MCNC: 13.3 G/DL (ref 11.5–16)
IMM GRANULOCYTES # BLD AUTO: 0 K/UL (ref 0–0.04)
IMM GRANULOCYTES NFR BLD AUTO: 0 % (ref 0–0.5)
LDLC SERPL CALC-MCNC: 151.2 MG/DL (ref 0–100)
LYMPHOCYTES # BLD: 1.6 K/UL (ref 0.8–3.5)
LYMPHOCYTES NFR BLD: 35 % (ref 12–49)
MCH RBC QN AUTO: 31.3 PG (ref 26–34)
MCHC RBC AUTO-ENTMCNC: 32.7 G/DL (ref 30–36.5)
MCV RBC AUTO: 95.8 FL (ref 80–99)
MONOCYTES # BLD: 0.3 K/UL (ref 0–1)
MONOCYTES NFR BLD: 6 % (ref 5–13)
NEUTS SEG # BLD: 2.6 K/UL (ref 1.8–8)
NEUTS SEG NFR BLD: 57 % (ref 32–75)
NRBC # BLD: 0 K/UL (ref 0–0.01)
NRBC BLD-RTO: 0 PER 100 WBC
PLATELET # BLD AUTO: 200 K/UL (ref 150–400)
PMV BLD AUTO: 11.1 FL (ref 8.9–12.9)
POTASSIUM SERPL-SCNC: 4 MMOL/L (ref 3.5–5.1)
PROT SERPL-MCNC: 7.2 G/DL (ref 6.4–8.2)
RBC # BLD AUTO: 4.25 M/UL (ref 3.8–5.2)
SODIUM SERPL-SCNC: 136 MMOL/L (ref 136–145)
TRIGL SERPL-MCNC: 69 MG/DL (ref ?–150)
TSH SERPL DL<=0.05 MIU/L-ACNC: 1.67 UIU/ML (ref 0.36–3.74)
VLDLC SERPL CALC-MCNC: 13.8 MG/DL
WBC # BLD AUTO: 4.6 K/UL (ref 3.6–11)

## 2022-04-05 PROCEDURE — 99204 OFFICE O/P NEW MOD 45 MIN: CPT | Performed by: INTERNAL MEDICINE

## 2022-04-05 RX ORDER — DESONIDE 0.5 MG/G
OINTMENT TOPICAL 2 TIMES DAILY
Qty: 15 G | Refills: 0 | Status: SHIPPED | OUTPATIENT
Start: 2022-04-05

## 2022-04-05 RX ORDER — CHOLECALCIFEROL (VITAMIN D3) 125 MCG
CAPSULE ORAL DAILY
COMMUNITY

## 2022-04-05 RX ORDER — CHOLECALCIFEROL (VITAMIN D3) 125 MCG
100 CAPSULE ORAL DAILY
COMMUNITY

## 2022-04-05 RX ORDER — ESTRADIOL 0.1 MG/G
2 CREAM VAGINAL DAILY
COMMUNITY

## 2022-04-05 NOTE — PROGRESS NOTES
HISTORY OF PRESENT ILLNESS  Pascual Johnson is a 56 y.o. female. HPI  Seen to re-establish. Last saw me in 2018. Still working at 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 Daughter is working as a TTA. Brings in records from [place] from 2020 including hemoglobin A1c at 5.1, TSH of 1.8, cholesterol of 246. Triglycerides 67, HDL 86, . Glucose 80. Chemistries within normal limits. Notes exercise has been limited because of trouble with left plantar fasciitis. Has done some stretching. Symptoms present for two months. Doing regular GYN care at the gynecologist, due for colonoscopy, last done with Dr. Miranda Camejo. Does have family history of colon cancer. Social history, no tobacco.  Limited alcohol. Working three days a week.  works full time. Lives in [place]. Review of Systems   Constitutional: Negative for chills, fever and weight loss. Respiratory: Negative for cough, shortness of breath and wheezing. Cardiovascular: Negative for chest pain, palpitations, orthopnea, leg swelling and PND. Gastrointestinal: Negative for abdominal pain, diarrhea, heartburn and nausea. Genitourinary: Negative for dysuria. Musculoskeletal: Positive for joint pain. Negative for myalgias. Neurological: Negative for dizziness and headaches. Psychiatric/Behavioral: Negative for depression. All other systems reviewed and are negative. Physical Exam  Vitals and nursing note reviewed. Constitutional:       Appearance: She is well-developed. HENT:      Head: Normocephalic and atraumatic. Right Ear: Tympanic membrane, ear canal and external ear normal.      Left Ear: Tympanic membrane, ear canal and external ear normal.      Nose: Nose normal.      Mouth/Throat:      Pharynx: No oropharyngeal exudate. Eyes:      General:         Right eye: No discharge. Left eye: No discharge.       Conjunctiva/sclera: Conjunctivae normal.      Pupils: Pupils are equal, round, and reactive to light. Neck:      Thyroid: No thyromegaly. Vascular: No carotid bruit. Cardiovascular:      Rate and Rhythm: Normal rate and regular rhythm. Heart sounds: Normal heart sounds, S1 normal and S2 normal. No murmur heard. Pulmonary:      Effort: Pulmonary effort is normal. No respiratory distress. Breath sounds: Normal breath sounds. No wheezing or rales. Abdominal:      General: There is no distension. Palpations: There is no mass. Tenderness: There is no abdominal tenderness. Musculoskeletal:      Cervical back: Normal range of motion and neck supple. Right lower leg: No edema. Left lower leg: No edema. Comments: Tender left plantar fascia insertion site   Lymphadenopathy:      Cervical: No cervical adenopathy. Neurological:      Mental Status: She is alert and oriented to person, place, and time. Psychiatric:         Mood and Affect: Mood normal.         Behavior: Behavior normal.         ASSESSMENT and PLAN  Diagnoses and all orders for this visit:    1. Well woman exam (no gynecological exam)  -     varicella-zoster [facility]) injection; 1 Each by IntraMUSCular route once for 1 dose. -     CBC WITH AUTOMATED DIFF; Future  -     METABOLIC PANEL, COMPREHENSIVE; Future  -     LIPID PANEL; Future  -     TSH 3RD GENERATION; Future  -     HEMOGLOBIN A1C WITH EAG; Future  -     REFERRAL TO COLON AND RECTAL SURGERY    2. FH: colon cancer in relative diagnosed at >48years old-needs colonoscopy    3. H/O: hysterectomy    4. Other eczema  -     desonide (DESOWEN) 0.05 % topical ointment; Apply  to affected area two (2) times a day.     5. Plantar fasciitis of left foot  -     REFERRAL TO PODIATRY

## 2024-03-26 ENCOUNTER — OFFICE VISIT (OUTPATIENT)
Age: 59
End: 2024-03-26
Payer: COMMERCIAL

## 2024-03-26 VITALS
HEIGHT: 63 IN | DIASTOLIC BLOOD PRESSURE: 78 MMHG | WEIGHT: 133 LBS | SYSTOLIC BLOOD PRESSURE: 118 MMHG | BODY MASS INDEX: 23.57 KG/M2 | OXYGEN SATURATION: 99 % | TEMPERATURE: 98.1 F | HEART RATE: 85 BPM | RESPIRATION RATE: 16 BRPM

## 2024-03-26 DIAGNOSIS — Z23 NEED FOR DIPHTHERIA-TETANUS-PERTUSSIS (TDAP) VACCINE: ICD-10-CM

## 2024-03-26 DIAGNOSIS — E78.00 HYPERCHOLESTEREMIA: ICD-10-CM

## 2024-03-26 DIAGNOSIS — E55.9 VITAMIN D DEFICIENCY: ICD-10-CM

## 2024-03-26 DIAGNOSIS — Z12.11 SCREENING FOR COLON CANCER: ICD-10-CM

## 2024-03-26 DIAGNOSIS — Z23 NEED FOR SHINGLES VACCINE: ICD-10-CM

## 2024-03-26 DIAGNOSIS — Z00.00 ANNUAL PHYSICAL EXAM: Primary | ICD-10-CM

## 2024-03-26 DIAGNOSIS — Z12.31 ENCOUNTER FOR SCREENING MAMMOGRAM FOR MALIGNANT NEOPLASM OF BREAST: ICD-10-CM

## 2024-03-26 DIAGNOSIS — Z00.00 ANNUAL PHYSICAL EXAM: ICD-10-CM

## 2024-03-26 LAB
25(OH)D3 SERPL-MCNC: 95.7 NG/ML (ref 30–100)
ALBUMIN SERPL-MCNC: 3.8 G/DL (ref 3.5–5)
ALBUMIN/GLOB SERPL: 1.2 (ref 1.1–2.2)
ALP SERPL-CCNC: 77 U/L (ref 45–117)
ALT SERPL-CCNC: 64 U/L (ref 12–78)
ANION GAP SERPL CALC-SCNC: 6 MMOL/L (ref 5–15)
AST SERPL-CCNC: 26 U/L (ref 15–37)
BASOPHILS # BLD: 0 K/UL (ref 0–0.1)
BASOPHILS NFR BLD: 1 % (ref 0–1)
BILIRUB SERPL-MCNC: 0.6 MG/DL (ref 0.2–1)
BUN SERPL-MCNC: 21 MG/DL (ref 6–20)
BUN/CREAT SERPL: 27 (ref 12–20)
CALCIUM SERPL-MCNC: 9.5 MG/DL (ref 8.5–10.1)
CHLORIDE SERPL-SCNC: 104 MMOL/L (ref 97–108)
CHOLEST SERPL-MCNC: 214 MG/DL
CO2 SERPL-SCNC: 28 MMOL/L (ref 21–32)
CREAT SERPL-MCNC: 0.79 MG/DL (ref 0.55–1.02)
DIFFERENTIAL METHOD BLD: NORMAL
EOSINOPHIL # BLD: 0.1 K/UL (ref 0–0.4)
EOSINOPHIL NFR BLD: 1 % (ref 0–7)
ERYTHROCYTE [DISTWIDTH] IN BLOOD BY AUTOMATED COUNT: 12.2 % (ref 11.5–14.5)
EST. AVERAGE GLUCOSE BLD GHB EST-MCNC: 100 MG/DL
GLOBULIN SER CALC-MCNC: 3.1 G/DL (ref 2–4)
GLUCOSE SERPL-MCNC: 89 MG/DL (ref 65–100)
HBA1C MFR BLD: 5.1 % (ref 4–5.6)
HCT VFR BLD AUTO: 39.9 % (ref 35–47)
HDLC SERPL-MCNC: 79 MG/DL
HDLC SERPL: 2.7 (ref 0–5)
HGB BLD-MCNC: 13.5 G/DL (ref 11.5–16)
IMM GRANULOCYTES # BLD AUTO: 0 K/UL (ref 0–0.04)
IMM GRANULOCYTES NFR BLD AUTO: 0 % (ref 0–0.5)
LDLC SERPL CALC-MCNC: 124 MG/DL (ref 0–100)
LYMPHOCYTES # BLD: 1.7 K/UL (ref 0.8–3.5)
LYMPHOCYTES NFR BLD: 39 % (ref 12–49)
MCH RBC QN AUTO: 31.9 PG (ref 26–34)
MCHC RBC AUTO-ENTMCNC: 33.8 G/DL (ref 30–36.5)
MCV RBC AUTO: 94.3 FL (ref 80–99)
MONOCYTES # BLD: 0.3 K/UL (ref 0–1)
MONOCYTES NFR BLD: 8 % (ref 5–13)
NEUTS SEG # BLD: 2.3 K/UL (ref 1.8–8)
NEUTS SEG NFR BLD: 51 % (ref 32–75)
NRBC # BLD: 0 K/UL (ref 0–0.01)
NRBC BLD-RTO: 0 PER 100 WBC
PLATELET # BLD AUTO: 222 K/UL (ref 150–400)
PMV BLD AUTO: 11.1 FL (ref 8.9–12.9)
POTASSIUM SERPL-SCNC: 4 MMOL/L (ref 3.5–5.1)
PROT SERPL-MCNC: 6.9 G/DL (ref 6.4–8.2)
RBC # BLD AUTO: 4.23 M/UL (ref 3.8–5.2)
SODIUM SERPL-SCNC: 138 MMOL/L (ref 136–145)
TRIGL SERPL-MCNC: 55 MG/DL
TSH SERPL DL<=0.05 MIU/L-ACNC: 1.59 UIU/ML (ref 0.36–3.74)
VLDLC SERPL CALC-MCNC: 11 MG/DL
WBC # BLD AUTO: 4.4 K/UL (ref 3.6–11)

## 2024-03-26 PROCEDURE — 99396 PREV VISIT EST AGE 40-64: CPT | Performed by: NURSE PRACTITIONER

## 2024-03-26 PROCEDURE — 90471 IMMUNIZATION ADMIN: CPT | Performed by: NURSE PRACTITIONER

## 2024-03-26 PROCEDURE — 90715 TDAP VACCINE 7 YRS/> IM: CPT | Performed by: NURSE PRACTITIONER

## 2024-03-26 SDOH — ECONOMIC STABILITY: FOOD INSECURITY: WITHIN THE PAST 12 MONTHS, THE FOOD YOU BOUGHT JUST DIDN'T LAST AND YOU DIDN'T HAVE MONEY TO GET MORE.: NEVER TRUE

## 2024-03-26 SDOH — ECONOMIC STABILITY: HOUSING INSECURITY
IN THE LAST 12 MONTHS, WAS THERE A TIME WHEN YOU DID NOT HAVE A STEADY PLACE TO SLEEP OR SLEPT IN A SHELTER (INCLUDING NOW)?: NO

## 2024-03-26 SDOH — ECONOMIC STABILITY: FOOD INSECURITY: WITHIN THE PAST 12 MONTHS, YOU WORRIED THAT YOUR FOOD WOULD RUN OUT BEFORE YOU GOT MONEY TO BUY MORE.: NEVER TRUE

## 2024-03-26 SDOH — ECONOMIC STABILITY: INCOME INSECURITY: HOW HARD IS IT FOR YOU TO PAY FOR THE VERY BASICS LIKE FOOD, HOUSING, MEDICAL CARE, AND HEATING?: NOT HARD AT ALL

## 2024-03-26 ASSESSMENT — PATIENT HEALTH QUESTIONNAIRE - PHQ9
2. FEELING DOWN, DEPRESSED OR HOPELESS: NOT AT ALL
1. LITTLE INTEREST OR PLEASURE IN DOING THINGS: NOT AT ALL
SUM OF ALL RESPONSES TO PHQ QUESTIONS 1-9: 0
SUM OF ALL RESPONSES TO PHQ9 QUESTIONS 1 & 2: 0
SUM OF ALL RESPONSES TO PHQ QUESTIONS 1-9: 0

## 2024-03-26 NOTE — PROGRESS NOTES
ROS: no breast pain or new or enlarging lumps on self exam, no vaginal bleeding, no hot flashes.    Physical exam  Blood pressure 118/78, pulse 85, temperature 98.1 °F (36.7 °C), temperature source Oral, resp. rate 16, height 1.6 m (5' 3\"), weight 60.3 kg (133 lb), SpO2 99 %.  Wt Readings from Last 3 Encounters:   03/26/24 60.3 kg (133 lb)   04/05/22 61.1 kg (134 lb 9.6 oz)     she appears well, alert and oriented x 3, pleasant and cooperative. Vitals as noted.   No rashes or significant lesions.  Neck supple and free of adenopathy, or masses.  No thyromegaly or carotid bruits. Cranial nerves normal. Lungs are clear to auscultation. Heart sounds are normal with no murmurs, clicks, gallops or rubs. Abdomen is soft, non- tender, with no masses or organomegaly. Extremities, peripheral pulses and reflexes are normal.  .   BREAST EXAM: breasts appear normal, no suspicious masses, no skin or nipple changes or axillary nodes, no axillary lymphadenopathy, risk and benefit of breast self-exam was discussed  PELVIC EXAM: examination not indicated      Elyse was seen today for annual exam.    Diagnoses and all orders for this visit:    Annual physical exam:   Yearly physical, dental and vision exams recommended  Colon, breast and cervical cancer screening guidelines discussed  Lab work ordered  Recommended vaccines discussed  Monthly self breast exam recommended  Will follow with GYN for pap smear and mammogram  Healthy lifestyle options discussed.  -     CBC with Auto Differential; Future  -     Hemoglobin A1C; Future  -     TSH; Future    Hypercholesteremia: Diet-controlled. Will check lipids  -     Lipid Panel; Future  -     Comprehensive Metabolic Panel; Future    Vitamin D deficiency: Takes daily supplements. Will check vitamin D level  -     Vitamin D 25 Hydroxy; Future    Need for shingles vaccine: Shingle vaccine series recommended    Need for diphtheria-tetanus-pertussis (Tdap) vaccine  -     Tdap, BOOSTRIX, (age 12

## 2025-02-27 ENCOUNTER — TELEPHONE (OUTPATIENT)
Facility: CLINIC | Age: 60
End: 2025-02-27

## 2025-06-12 ENCOUNTER — OFFICE VISIT (OUTPATIENT)
Facility: CLINIC | Age: 60
End: 2025-06-12
Payer: COMMERCIAL

## 2025-06-12 VITALS
SYSTOLIC BLOOD PRESSURE: 120 MMHG | HEART RATE: 84 BPM | DIASTOLIC BLOOD PRESSURE: 80 MMHG | WEIGHT: 132 LBS | OXYGEN SATURATION: 97 % | BODY MASS INDEX: 23.39 KG/M2 | TEMPERATURE: 97.9 F | RESPIRATION RATE: 16 BRPM | HEIGHT: 63 IN

## 2025-06-12 DIAGNOSIS — L30.9 DERMATITIS: ICD-10-CM

## 2025-06-12 DIAGNOSIS — Z80.0 FH: COLON CANCER: ICD-10-CM

## 2025-06-12 DIAGNOSIS — Z00.00 ANNUAL PHYSICAL EXAM: Primary | ICD-10-CM

## 2025-06-12 DIAGNOSIS — M85.89 OSTEOPENIA OF MULTIPLE SITES: ICD-10-CM

## 2025-06-12 DIAGNOSIS — M54.31 SCIATICA OF RIGHT SIDE: ICD-10-CM

## 2025-06-12 DIAGNOSIS — Z00.00 ANNUAL PHYSICAL EXAM: ICD-10-CM

## 2025-06-12 PROCEDURE — 99396 PREV VISIT EST AGE 40-64: CPT | Performed by: INTERNAL MEDICINE

## 2025-06-12 RX ORDER — DESONIDE 0.5 MG/G
OINTMENT TOPICAL 2 TIMES DAILY
Qty: 15 G | Refills: 2 | Status: SHIPPED | OUTPATIENT
Start: 2025-06-12

## 2025-06-12 SDOH — ECONOMIC STABILITY: FOOD INSECURITY: WITHIN THE PAST 12 MONTHS, THE FOOD YOU BOUGHT JUST DIDN'T LAST AND YOU DIDN'T HAVE MONEY TO GET MORE.: NEVER TRUE

## 2025-06-12 SDOH — ECONOMIC STABILITY: FOOD INSECURITY: WITHIN THE PAST 12 MONTHS, YOU WORRIED THAT YOUR FOOD WOULD RUN OUT BEFORE YOU GOT MONEY TO BUY MORE.: NEVER TRUE

## 2025-06-12 ASSESSMENT — PATIENT HEALTH QUESTIONNAIRE - PHQ9
2. FEELING DOWN, DEPRESSED OR HOPELESS: NOT AT ALL
SUM OF ALL RESPONSES TO PHQ QUESTIONS 1-9: 0
SUM OF ALL RESPONSES TO PHQ QUESTIONS 1-9: 0
1. LITTLE INTEREST OR PLEASURE IN DOING THINGS: NOT AT ALL
SUM OF ALL RESPONSES TO PHQ QUESTIONS 1-9: 0
SUM OF ALL RESPONSES TO PHQ QUESTIONS 1-9: 0

## 2025-06-12 NOTE — PROGRESS NOTES
appearance.   HENT:      Head: Normocephalic and atraumatic.      Right Ear: Tympanic membrane and ear canal normal.      Left Ear: Tympanic membrane and ear canal normal.      Mouth/Throat:      Mouth: Mucous membranes are moist.   Cardiovascular:      Rate and Rhythm: Normal rate and regular rhythm.   Pulmonary:      Effort: Pulmonary effort is normal.      Breath sounds: Normal breath sounds.   Abdominal:      General: Bowel sounds are normal. There is no distension.      Palpations: Abdomen is soft. There is no mass.   Musculoskeletal:         General: Normal range of motion.      Right lower leg: No edema.      Left lower leg: No edema.   Lymphadenopathy:      Cervical: No cervical adenopathy.   Neurological:      General: No focal deficit present.      Mental Status: She is alert and oriented to person, place, and time.      Deep Tendon Reflexes: Reflexes normal.   Psychiatric:         Mood and Affect: Mood normal.         Behavior: Behavior normal.         Latest Ref Rng & Units 3/26/2024     8:42 AM 4/5/2022     9:13 AM   LAB PRIMARY CARE   A1C 4.0 - 5.6 % 5.1  5.2    A1C POC 4.0 - 5.6 % 5.1  5.2    GLU random 65 - 100 mg/dL 89  82    CHOL <200 MG/  238    TRIG <150 MG/DL 55  69    HDL MG/DL 79  73    LDL CALC 0 - 100 MG/  151.2     - 145 mmol/L 138  136    K 3.5 - 5.1 mmol/L 4.0  4.0    BUN 6 - 20 MG/DL 21  17    CR 0.55 - 1.02 MG/DL 0.79  0.69    GFR >60 ml/min/1.73m2 87     CA 8.5 - 10.1 MG/DL 9.5  8.7    ALT 12 - 78 U/L 64  49    AST 15 - 37 U/L 26  35    TSH 0.36 - 3.74 uIU/mL 1.59  1.67    HGB 11.5 - 16.0 g/dL 13.5  13.3        Lab Results   Component Value Date/Time    CHOL 214 03/26/2024 08:42 AM    CHOL 238 04/05/2022 09:13 AM    TRIG 55 03/26/2024 08:42 AM    TRIG 69 04/05/2022 09:13 AM    HDL 79 03/26/2024 08:42 AM    HDL 73 04/05/2022 09:13 AM    GLUCOSE 89 03/26/2024 08:42 AM    LABA1C 5.1 03/26/2024 08:42 AM    LABA1C 5.2 04/05/2022 09:13 AM       The 10-year ASCVD risk score

## 2025-06-21 LAB
25(OH)D3+25(OH)D2 SERPL-MCNC: 44.5 NG/ML (ref 30–100)
ALBUMIN SERPL-MCNC: 4.3 G/DL (ref 3.8–4.9)
ALP SERPL-CCNC: 92 IU/L (ref 44–121)
ALT SERPL-CCNC: 50 IU/L (ref 0–32)
AST SERPL-CCNC: 44 IU/L (ref 0–40)
BILIRUB SERPL-MCNC: 0.3 MG/DL (ref 0–1.2)
BUN SERPL-MCNC: 17 MG/DL (ref 6–24)
BUN/CREAT SERPL: 25 (ref 9–23)
CALCIUM SERPL-MCNC: 9.4 MG/DL (ref 8.7–10.2)
CHLORIDE SERPL-SCNC: 103 MMOL/L (ref 96–106)
CHOLEST SERPL-MCNC: 239 MG/DL (ref 100–199)
CO2 SERPL-SCNC: 24 MMOL/L (ref 20–29)
CREAT SERPL-MCNC: 0.68 MG/DL (ref 0.57–1)
EGFRCR SERPLBLD CKD-EPI 2021: 101 ML/MIN/1.73
ERYTHROCYTE [DISTWIDTH] IN BLOOD BY AUTOMATED COUNT: 11.9 % (ref 11.7–15.4)
GLOBULIN SER CALC-MCNC: 2.6 G/DL (ref 1.5–4.5)
GLUCOSE SERPL-MCNC: 86 MG/DL (ref 70–99)
HBA1C MFR BLD: 5.3 % (ref 4.8–5.6)
HCT VFR BLD AUTO: 40.7 % (ref 34–46.6)
HDLC SERPL-MCNC: 91 MG/DL
HGB BLD-MCNC: 13.2 G/DL (ref 11.1–15.9)
IMP & REVIEW OF LAB RESULTS: NORMAL
LDLC SERPL CALC-MCNC: 137 MG/DL (ref 0–99)
MCH RBC QN AUTO: 31.1 PG (ref 26.6–33)
MCHC RBC AUTO-ENTMCNC: 32.4 G/DL (ref 31.5–35.7)
MCV RBC AUTO: 96 FL (ref 79–97)
PLATELET # BLD AUTO: 256 X10E3/UL (ref 150–450)
POTASSIUM SERPL-SCNC: 4.7 MMOL/L (ref 3.5–5.2)
PROT SERPL-MCNC: 6.9 G/DL (ref 6–8.5)
RBC # BLD AUTO: 4.24 X10E6/UL (ref 3.77–5.28)
SODIUM SERPL-SCNC: 140 MMOL/L (ref 134–144)
TRIGL SERPL-MCNC: 67 MG/DL (ref 0–149)
TSH SERPL DL<=0.005 MIU/L-ACNC: 1.3 UIU/ML (ref 0.45–4.5)
VLDLC SERPL CALC-MCNC: 11 MG/DL (ref 5–40)
WBC # BLD AUTO: 4.4 X10E3/UL (ref 3.4–10.8)

## 2025-06-22 ENCOUNTER — RESULTS FOLLOW-UP (OUTPATIENT)
Facility: CLINIC | Age: 60
End: 2025-06-22

## 2025-06-22 DIAGNOSIS — R74.01 TRANSAMINITIS: Primary | ICD-10-CM
